# Patient Record
Sex: MALE | Race: WHITE
[De-identification: names, ages, dates, MRNs, and addresses within clinical notes are randomized per-mention and may not be internally consistent; named-entity substitution may affect disease eponyms.]

---

## 2021-10-20 ENCOUNTER — HOSPITAL ENCOUNTER (EMERGENCY)
Dept: HOSPITAL 76 - ED | Age: 66
Discharge: HOME | End: 2021-10-20
Payer: MEDICARE

## 2021-10-20 VITALS — DIASTOLIC BLOOD PRESSURE: 89 MMHG | SYSTOLIC BLOOD PRESSURE: 165 MMHG

## 2021-10-20 DIAGNOSIS — Z79.82: ICD-10-CM

## 2021-10-20 DIAGNOSIS — L03.115: Primary | ICD-10-CM

## 2021-10-20 DIAGNOSIS — I10: ICD-10-CM

## 2021-10-20 PROCEDURE — 99284 EMERGENCY DEPT VISIT MOD MDM: CPT

## 2021-10-20 PROCEDURE — 93971 EXTREMITY STUDY: CPT

## 2021-10-20 PROCEDURE — 99283 EMERGENCY DEPT VISIT LOW MDM: CPT

## 2021-10-20 NOTE — ULTRASOUND REPORT
PROCEDURE:  Duplex Ext Veins Right

 

INDICATIONS:  right calf red/swelling; recent drive to [place]

 

TECHNIQUE:  

Real-time imaging, as well as color and pulse Doppler interrogation, were performed of the lower extr
emity deep veins from the inguinal ligament to the popliteal fossa.  

 

COMPARISON:  None.

 

FINDINGS:  The deep veins are normally compressible, and free of intraluminal thrombus.  Color and pu
lse Doppler demonstrate normal phasic intraluminal flow.  There is normal augmentation response to di
stal compression maneuver. 

 

Right lateral mid calf in the region of clinical concern is a subcutaneous fluid collection measuring
 2.4 x 2.4 x 0.7 cm, estimated volume of 2 cc.

 

IMPRESSION:  

No right lower extremity DVT.

 

Heterogeneous fluid collection or hematoma in the region of concern at the right lateral calf measuri
ng 2.4 cm. Volume of 2 cc.

 

Reviewed by: Ranjeet Ceballos MD on 10/20/2021 9:19 AM PDT

Approved by: Ranjeet Ceballos MD on 10/20/2021 9:19 AM PDT

 

 

Station ID:  [id number]

## 2021-10-20 NOTE — ED PHYSICIAN DOCUMENTATION
PD HPI LOWER EXT INJURY





- Stated complaint


Stated Complaint: RT LEG SWOLLEN





- Chief complaint


Chief Complaint: Ext Problem





- History obtained from


History obtained from: Patient





- History of Present Illness


PD HPI LOW EXT INJURY LOCATION: Right, Lower leg


Type of injury: Other (he had small lac front of lower leg 2 weeks ago and 

mostly healed with small scab still. Has area of redness/warmth/tender lateral 

to that, without injury to the red area per se. Has had the red area for a week,

increased the past 2 days. Had driven to [place] for a month and just returned 

yesterday.).  No: Fall, Twist


Where injury occurred: Other (his son's house in [place] where he was doing home 

repairs.)


Timing - onset: How many weeks ago (1)


Timing - duration: Weeks (1)


Timing - details: Gradual onset, Still present


Worsened by: Palpating


Associated symptoms: Swelling, Discolored (redness).  No: Weakness, Numbness


Similar symptoms before: Has not had sx before





Review of Systems


Constitutional: denies: Fever, Chills, Myalgias


Musculoskeletal: denies: Neck pain, Back pain


Neurologic: denies: Focal weakness, Numbness





PD PAST MEDICAL HISTORY





- Past Medical History


Past Medical History: Yes


Cardiovascular: Hypertension, High cholesterol, MI


: Kidney stones





- Past Surgical History


Past Surgical History: Yes


General: Appendectomy


Cardiovascular: Cardiac catheterization





- Present Medications


Home Medications: 


                                Ambulatory Orders











 Medication  Instructions  Recorded  Confirmed


 


Alfuzosin HCl [Alfuzosin HCl ER] 10 mg PO DAILY 10/20/21 10/20/21


 


Aspirin [[place] Aspirin] 81 mg PO DAILY 10/20/21 10/20/21


 


Atorvastatin [Lipitor] 40 mg PO DAILY 10/20/21 10/20/21


 


Doxycycline Hyclate 100 mg PO BID 7 Days #14 cap 10/20/21 


 


Finasteride [Proscar] 5 mg pe PO DAILY 10/20/21 10/20/21


 


Metoprolol Succinate [Toprol Xl] 50 mg pe PO DAILY 10/20/21 10/20/21


 


allopurinoL [Allopurinol] 300 mg PO DAILY 10/20/21 10/20/21


 


amLODIPine [Norvasc] 5 mg pe PO DAILY 10/20/21 10/20/21


 


lisinopriL [Lisinopril] 40 mg PO DAILY 10/20/21 10/20/21














- Allergies


Allergies/Adverse Reactions: 


                                    Allergies











Allergy/AdvReac Type Severity Reaction Status Date / Time


 


No Known Drug Allergies Allergy   Verified 10/20/21 06:59














- Social History


Does the pt smoke?: No


Smoking Status: Never smoker


Does the pt drink ETOH?: Yes


Does the pt have substance abuse?: No





- Immunizations


Immunizations are current?: Yes





PD ED PE NORMAL





- Vitals


Vital signs reviewed: Yes





- General


General: Alert and oriented X 3, No acute distress, Well developed/nourished





- Derm


Derm: Normal color, Warm and dry





- Extremities


Extremities: Other (right lateral lower leg with area of redness and warmth, 

firmness without fluctuance about 4 cm diameter. anterior to that is small 

scabbed abrasions just near edge of the redness. No posterior calf tenderness 

per se. )





- Neuro


Neuro: Alert and oriented X 3, No motor deficit, Normal speech





Results





- Vitals


Vitals: 


                               Vital Signs - 24 hr











  10/20/21 10/20/21





  06:59 09:55


 


Temperature 36.0 C L 


 


Heart Rate 82 80


 


Respiratory 17 16





Rate  


 


Blood Pressure 168/91 H 165/89 H


 


O2 Saturation 98 99








                                     Oxygen











O2 Source                      Room air

















- Rads (name of study)


  ** duplex right leg


Radiology: Prelim report reviewed (No DVT. Area of swelling with small fluid 

collection at red area with est 2 ml fluid.), See rad report





PD MEDICAL DECISION MAKING





- ED course


Complexity details: considered differential (seems likely skin infection. Can 

get U/S to ensure no DVT. There is minimal fluid at area of infection (2 ml on 

U/S report) so fairly small target for incising and will try abx without I&D 

initially.), d/w patient





Departure





- Departure


Disposition: 01 Home, Self Care


Clinical Impression: 


Lower extremity cellulitis


Qualifiers:


 Laterality: right Qualified Code(s): L03.115 - Cellulitis of right lower limb





Condition: Stable


Record reviewed to determine appropriate education?: Yes


Instructions:  ED Infec Skin Cellulitis


Follow-Up: 


Reyna Kenyon MD [Primary Care Provider] - 


Prescriptions: 


Doxycycline Hyclate 100 mg PO BID 7 Days #14 cap


Comments: 


Warm moist towels to the area or warm soaks to improve blood flow and help fight

off the infection.  Your ultrasound does not show any signs of blood clots.  

There is a very small fluid collection at the area measuring just 2 mL.  This is

small enough that it should be approachable with antibiotics only.  If the 

infection persists or increases in size, it may be that it needs incising and 

draining.  Return if that happens.  Otherwise I would anticipate improvement 

over the next several days.  Tylenol ibuprofen if needed for pains.  Activity as

tolerated.


Discharge Date/Time: 10/20/21 09:50